# Patient Record
Sex: FEMALE | NOT HISPANIC OR LATINO | Employment: FULL TIME | URBAN - METROPOLITAN AREA
[De-identification: names, ages, dates, MRNs, and addresses within clinical notes are randomized per-mention and may not be internally consistent; named-entity substitution may affect disease eponyms.]

---

## 2024-10-03 PROBLEM — K43.2 INCISIONAL HERNIA: Status: ACTIVE | Noted: 2024-10-03

## 2024-10-03 PROBLEM — R42 LIGHTHEADEDNESS: Status: ACTIVE | Noted: 2024-10-03

## 2024-10-03 PROBLEM — R11.0 NAUSEA: Status: ACTIVE | Noted: 2024-10-03

## 2024-10-03 PROBLEM — R07.9 CHEST PAIN: Status: ACTIVE | Noted: 2024-10-03

## 2024-10-03 PROBLEM — R07.81 RIB PAIN: Status: ACTIVE | Noted: 2024-10-03
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
## 2024-10-03 PROBLEM — R07.2 PRECORDIAL PAIN: Status: ACTIVE | Noted: 2024-10-03

## 2024-10-03 PROBLEM — S29.9XXA CHEST INJURY: Status: ACTIVE | Noted: 2024-10-03

## 2024-10-03 PROBLEM — M17.9 OSTEOARTHRITIS OF KNEE: Status: ACTIVE | Noted: 2024-10-03

## 2024-10-03 PROBLEM — J45.901 EXACERBATION OF ASTHMA (HHS-HCC): Status: ACTIVE | Noted: 2024-10-03

## 2024-10-03 PROBLEM — I50.9 HEART FAILURE: Status: ACTIVE | Noted: 2024-10-03

## 2024-10-03 PROBLEM — I73.00 RAYNAUD'S PHENOMENON: Status: ACTIVE | Noted: 2024-10-03

## 2024-10-03 PROBLEM — R06.02 SHORTNESS OF BREATH: Status: ACTIVE | Noted: 2024-10-03

## 2024-10-03 PROBLEM — F41.9 ANXIETY DISORDER: Status: ACTIVE | Noted: 2024-10-03

## 2024-10-03 PROBLEM — E78.2 MIXED HYPERLIPIDEMIA: Status: ACTIVE | Noted: 2024-10-03

## 2024-10-03 PROBLEM — M79.606 PAIN OF LOWER EXTREMITY: Status: ACTIVE | Noted: 2024-10-03

## 2024-10-03 PROBLEM — R42 DIZZINESS: Status: ACTIVE | Noted: 2024-10-03

## 2024-10-03 PROBLEM — K57.92 DIVERTICULITIS: Status: ACTIVE | Noted: 2024-10-03

## 2024-10-03 PROBLEM — R07.89 CHEST DISCOMFORT: Status: ACTIVE | Noted: 2024-10-03

## 2024-10-03 PROBLEM — R05.9 COUGH: Status: ACTIVE | Noted: 2024-10-03

## 2024-10-03 PROBLEM — R61 EXCESSIVE SWEATING: Status: ACTIVE | Noted: 2024-10-03

## 2024-10-03 PROBLEM — R43.9 DISORDER OF TASTE: Status: ACTIVE | Noted: 2024-10-03

## 2024-10-03 PROBLEM — R10.9 ABDOMINAL PAIN: Status: ACTIVE | Noted: 2024-10-03

## 2024-10-14 ENCOUNTER — OFFICE VISIT (OUTPATIENT)
Dept: SURGERY | Facility: CLINIC | Age: 54
End: 2024-10-14
Payer: COMMERCIAL

## 2024-10-14 VITALS
TEMPERATURE: 98.3 F | BODY MASS INDEX: 31.92 KG/M2 | SYSTOLIC BLOOD PRESSURE: 102 MMHG | HEART RATE: 86 BPM | HEIGHT: 64 IN | WEIGHT: 187 LBS | DIASTOLIC BLOOD PRESSURE: 64 MMHG | OXYGEN SATURATION: 99 %

## 2024-10-14 DIAGNOSIS — Z87.19 HISTORY OF DIVERTICULITIS: ICD-10-CM

## 2024-10-14 DIAGNOSIS — K64.8 BLEEDING INTERNAL HEMORRHOIDS: Primary | ICD-10-CM

## 2024-10-14 PROCEDURE — 99203 OFFICE O/P NEW LOW 30 MIN: CPT | Performed by: STUDENT IN AN ORGANIZED HEALTH CARE EDUCATION/TRAINING PROGRAM

## 2024-10-14 PROCEDURE — 3008F BODY MASS INDEX DOCD: CPT | Performed by: STUDENT IN AN ORGANIZED HEALTH CARE EDUCATION/TRAINING PROGRAM

## 2024-10-14 PROCEDURE — 46221 LIGATION OF HEMORRHOID(S): CPT | Performed by: STUDENT IN AN ORGANIZED HEALTH CARE EDUCATION/TRAINING PROGRAM

## 2024-10-14 PROCEDURE — 99213 OFFICE O/P EST LOW 20 MIN: CPT | Performed by: STUDENT IN AN ORGANIZED HEALTH CARE EDUCATION/TRAINING PROGRAM

## 2024-10-14 RX ORDER — VIT C/E/ZN/COPPR/LUTEIN/ZEAXAN 250MG-90MG
1 CAPSULE ORAL
COMMUNITY

## 2024-10-14 RX ORDER — ROPINIROLE 0.25 MG/1
1 TABLET, FILM COATED ORAL 3 TIMES DAILY
COMMUNITY
Start: 2024-01-12

## 2024-10-14 RX ORDER — VARENICLINE TARTRATE 1 MG/1
1 TABLET, FILM COATED ORAL
COMMUNITY
Start: 2024-02-15

## 2024-10-14 RX ORDER — GABAPENTIN 400 MG/1
CAPSULE ORAL 3 TIMES DAILY
COMMUNITY
Start: 2024-09-16 | End: 2024-10-16

## 2024-10-14 RX ORDER — FLUTICASONE FUROATE AND VILANTEROL 200; 25 UG/1; UG/1
1 POWDER RESPIRATORY (INHALATION)
COMMUNITY
Start: 2024-08-15

## 2024-10-14 RX ORDER — ACETAMINOPHEN 325 MG/1
2 TABLET ORAL EVERY 6 HOURS
COMMUNITY
Start: 2024-04-19

## 2024-10-14 RX ORDER — ASPIRIN 81 MG/1
TABLET ORAL EVERY 24 HOURS
COMMUNITY

## 2024-10-14 RX ORDER — KETOCONAZOLE 20 MG/G
CREAM TOPICAL
COMMUNITY
Start: 2023-12-06

## 2024-10-14 RX ORDER — TIZANIDINE 4 MG/1
TABLET ORAL EVERY 8 HOURS PRN
COMMUNITY

## 2024-10-14 RX ORDER — FEXOFENADINE HCL AND PSEUDOEPHEDRINE HCL 180; 240 MG/1; MG/1
1 TABLET, EXTENDED RELEASE ORAL
COMMUNITY
Start: 2024-09-23 | End: 2025-03-22

## 2024-10-14 RX ORDER — LEVALBUTEROL TARTRATE 45 UG/1
2 AEROSOL, METERED ORAL EVERY 4 HOURS PRN
COMMUNITY
Start: 2024-09-16

## 2024-10-14 RX ORDER — BLOOD-GLUCOSE METER
EACH MISCELLANEOUS
COMMUNITY
Start: 2023-12-21

## 2024-10-14 RX ORDER — ATORVASTATIN CALCIUM 20 MG/1
1 TABLET, FILM COATED ORAL DAILY
COMMUNITY
Start: 2023-10-26

## 2024-10-14 RX ORDER — IBUPROFEN 100 MG/5ML
1000 SUSPENSION, ORAL (FINAL DOSE FORM) ORAL DAILY
COMMUNITY

## 2024-10-14 RX ORDER — OMEPRAZOLE 40 MG/1
1 CAPSULE, DELAYED RELEASE ORAL 2 TIMES DAILY
COMMUNITY
Start: 2024-09-23 | End: 2025-03-22

## 2024-10-14 RX ORDER — METFORMIN HYDROCHLORIDE 500 MG/1
2 TABLET, EXTENDED RELEASE ORAL
COMMUNITY
Start: 2023-10-26

## 2024-10-14 RX ORDER — TOPIRAMATE 25 MG/1
25 TABLET ORAL NIGHTLY
COMMUNITY

## 2024-10-14 RX ORDER — LANCETS 30 GAUGE
EACH MISCELLANEOUS
COMMUNITY
Start: 2023-12-19

## 2024-10-14 RX ORDER — ERGOCALCIFEROL 1.25 MG/1
CAPSULE ORAL WEEKLY
COMMUNITY
Start: 2024-09-30 | End: 2024-12-29

## 2024-10-14 RX ORDER — HYDROCODONE BITARTRATE AND ACETAMINOPHEN 5; 325 MG/1; MG/1
TABLET ORAL
COMMUNITY
Start: 2024-07-15

## 2024-10-14 RX ORDER — IPRATROPIUM BROMIDE AND ALBUTEROL SULFATE 2.5; .5 MG/3ML; MG/3ML
SOLUTION RESPIRATORY (INHALATION) 3 TIMES DAILY
COMMUNITY
Start: 2024-06-06

## 2024-10-14 RX ORDER — DULOXETIN HYDROCHLORIDE 30 MG/1
3 CAPSULE, DELAYED RELEASE ORAL DAILY
COMMUNITY
Start: 2024-07-15

## 2024-10-14 ASSESSMENT — PATIENT HEALTH QUESTIONNAIRE - PHQ9
10. IF YOU CHECKED OFF ANY PROBLEMS, HOW DIFFICULT HAVE THESE PROBLEMS MADE IT FOR YOU TO DO YOUR WORK, TAKE CARE OF THINGS AT HOME, OR GET ALONG WITH OTHER PEOPLE: NOT DIFFICULT AT ALL
SUM OF ALL RESPONSES TO PHQ9 QUESTIONS 1 AND 2: 1
2. FEELING DOWN, DEPRESSED OR HOPELESS: SEVERAL DAYS
1. LITTLE INTEREST OR PLEASURE IN DOING THINGS: NOT AT ALL

## 2024-10-14 ASSESSMENT — PAIN SCALES - GENERAL: PAINLEVEL: 0-NO PAIN

## 2024-10-14 NOTE — LETTER
October 14, 2024     Patient: Jessica Sadler   YOB: 1970   Date of Visit: 10/14/2024       To Whom It May Concern:    Jessica Sadler was seen in my clinic on 10/14/2024 at 3:00 pm. Please excuse Jessica for her absence from work on this day to make the appointment.    If you have any questions or concerns, please don't hesitate to call the office at [phone].         Sincerely,         Maria Luz Oden MD

## 2024-10-20 ASSESSMENT — ENCOUNTER SYMPTOMS
BLOOD IN STOOL: 0
SPEECH DIFFICULTY: 0
TROUBLE SWALLOWING: 0
VOICE CHANGE: 0
FACIAL ASYMMETRY: 0
RECTAL BLEEDING: 1
CHEST TIGHTNESS: 0
ARTHRALGIAS: 0
UNEXPECTED WEIGHT CHANGE: 0
WOUND: 0
ABDOMINAL PAIN: 0
NAUSEA: 0
DYSURIA: 0
ADENOPATHY: 0
FEVER: 0
HEMATURIA: 0
CHILLS: 0
VOMITING: 0
ANAL BLEEDING: 1
HEADACHES: 0
SORE THROAT: 0
BRUISES/BLEEDS EASILY: 0
DIARRHEA: 0
PALPITATIONS: 0
SHORTNESS OF BREATH: 0

## 2024-10-20 NOTE — PROGRESS NOTES
History Of Present Illness  Jessica Sadler is a 54 y.o. female presenting for evaluation of hemorrhoids seen on recent colonoscopy and following up after episode of diverticulitis in July. She reports occasional bleeding from her hemorrhoids, some discomfort. Colonoscopy was performed last month showed diverticula and hemorrhoids. She had uncomplicated diverticulitis in July and was treated with oral ABX.      Past Medical History  She has a past medical history of Anxiety disorder, COPD (chronic obstructive pulmonary disease) (Multi), Diverticulitis, High cholesterol, Lung cancer (Multi) (2024), and Sleep apnea (2024).    Surgical History  She has a past surgical history that includes Lung cancer surgery (2024); Breast biopsy;  section, classic; Tubal ligation; Hysterectomy; and Shoulder surgery (Left).     Social History  She reports that she has been smoking cigarettes. She has never been exposed to tobacco smoke. She has never used smokeless tobacco. She reports that she does not drink alcohol and does not use drugs.    Family History  Family History   Problem Relation Name Age of Onset    Diabetes type II Mother      Heart disease Mother      Cervical cancer Mother      Diabetes type II Father      Asthma Father          Allergies  Naproxen, Sulfa (sulfonamide antibiotics), Benzonatate, Cherry, Coconut, and Monosodium glutamate    Review of Systems   Constitutional:  Negative for chills, fever and unexpected weight change.   HENT:  Negative for sneezing, sore throat, trouble swallowing and voice change.    Respiratory:  Negative for chest tightness and shortness of breath.    Cardiovascular:  Negative for chest pain and palpitations.   Gastrointestinal:  Positive for anal bleeding. Negative for abdominal pain, blood in stool, diarrhea, nausea and vomiting.   Endocrine: Negative for cold intolerance and heat intolerance.   Genitourinary:  Negative for decreased urine volume, dysuria and  "hematuria.   Musculoskeletal:  Negative for arthralgias and gait problem.   Skin:  Negative for rash and wound.   Neurological:  Negative for facial asymmetry, speech difficulty and headaches.   Hematological:  Negative for adenopathy. Does not bruise/bleed easily.   Psychiatric/Behavioral:  Negative for self-injury and suicidal ideas.         Physical Exam  Vitals and nursing note reviewed.   Constitutional:       Appearance: Normal appearance.   HENT:      Head: Normocephalic and atraumatic.      Mouth/Throat:      Mouth: Mucous membranes are moist.      Pharynx: Oropharynx is clear.   Eyes:      Extraocular Movements: Extraocular movements intact.      Pupils: Pupils are equal, round, and reactive to light.   Cardiovascular:      Rate and Rhythm: Normal rate and regular rhythm.      Pulses: Normal pulses.   Pulmonary:      Effort: Pulmonary effort is normal.      Breath sounds: Normal breath sounds.   Abdominal:      General: There is no distension.      Palpations: Abdomen is soft.      Tenderness: There is no abdominal tenderness.   Genitourinary:      Musculoskeletal:      Cervical back: Normal range of motion and neck supple.   Skin:     General: Skin is warm and dry.   Neurological:      General: No focal deficit present.      Mental Status: She is alert and oriented to person, place, and time.   Psychiatric:         Mood and Affect: Mood normal.         Behavior: Behavior normal.          Last Recorded Vitals  Blood pressure 102/64, pulse 86, temperature 36.8 °C (98.3 °F), temperature source Oral, height 1.626 m (5' 4\"), weight 84.8 kg (187 lb), SpO2 99%.    Relevant Results  Reviewed reports from multiple previous CTs, images unavailable due to different hospital system     Assessment/Plan   Problem List Items Addressed This Visit    None  Visit Diagnoses         Codes    Bleeding internal hemorrhoids    -  Primary K64.8    History of diverticulitis     Z87.19          We discussed the etiology of " hemorrhoids and the different treatment options based on location. Because she is having bleeding, recommend banding here in the office. Discussed the procedure and postprocedural expectations. See procedure note. Will have her follow up in 6 weeks to reassess symptoms and discuss additional banding if needed    Also discussed diverticular disease and the etiology and different treatment options from conservative to surgical. Since her episodes have been fairly infrequent and managed with outpatient ABX, I recommended we continue with expectant management at this time. She has numerous diverticula throughout the colon so even though majority of her episodes have been in the descending/sigmoid area, resecting this would prevent future episodes in other diverticula. She understands and will continue to monitor for symptoms. Encouraged high fiber foods and fiber supplement such as benefiber.       Maria Luz Oden MD    Patient ID: Jessica Sadler is a 54 y.o. female.    Anoscopy    Date/Time: 10/14/2024 3:00 PM    Performed by: Maria Luz Oden MD  Authorized by: Maria Luz Oden MD    Consent:     Consent obtained:  Verbal and written    Consent given by:  Patient    Risks, benefits, and alternatives were discussed: yes      Risks discussed:  Bleeding and pain    Alternatives discussed:  No treatment and alternative treatment  Universal protocol:     Procedure explained and questions answered to patient or proxy's satisfaction: yes      Immediately prior to procedure, a time out was called: yes      Patient identity confirmed:  Verbally with patient  Indications:     Indications: rectal bleeding    Procedure details:     Internal hemorrhoids: yes      Internal hemorrhoid position: right and left posterior.  Post-procedure details:     Procedure completion:  Tolerated well, no immediate complications  Comments:      A lubed anoscope was placed into the anal canal and revealed enlarged right and left posterior internal  hemorrhoids. A suction  was placed at the base of each hemorrhoid column and a band was successfully deployed. Patient tolerated procedure well.

## 2024-12-06 PROBLEM — M99.04 SOMATIC DYSFUNCTION OF SACRAL REGION: Status: ACTIVE | Noted: 2020-11-18

## 2024-12-06 PROBLEM — J18.9 PNEUMONIA OF LEFT LOWER LOBE DUE TO INFECTIOUS ORGANISM: Status: ACTIVE | Noted: 2024-01-30

## 2024-12-06 PROBLEM — M25.512 ACUTE PAIN OF LEFT SHOULDER: Status: ACTIVE | Noted: 2018-06-25

## 2024-12-06 PROBLEM — M99.00 HEAD REGION SOMATIC DYSFUNCTION: Status: ACTIVE | Noted: 2021-01-14

## 2024-12-06 PROBLEM — K21.9 GERD (GASTROESOPHAGEAL REFLUX DISEASE): Status: ACTIVE | Noted: 2024-12-06

## 2024-12-06 PROBLEM — M47.813 SPONDYLOSIS OF CERVICOTHORACIC REGION WITHOUT MYELOPATHY OR RADICULOPATHY: Status: ACTIVE | Noted: 2022-01-26

## 2024-12-06 PROBLEM — M99.03 SOMATIC DYSFUNCTION OF LUMBAR REGION: Status: ACTIVE | Noted: 2020-12-07

## 2024-12-06 PROBLEM — E66.811 OBESITY, CLASS I, BMI 30-34.9: Status: ACTIVE | Noted: 2018-04-04

## 2024-12-06 PROBLEM — C34.90 LUNG CANCER (MULTI): Status: ACTIVE | Noted: 2024-04-15

## 2024-12-06 PROBLEM — R73.9 HYPERGLYCEMIA: Status: ACTIVE | Noted: 2024-02-05

## 2024-12-06 PROBLEM — G89.21 CHRONIC PAIN DUE TO TRAUMA: Status: ACTIVE | Noted: 2024-04-15

## 2024-12-06 PROBLEM — G25.81 RLS (RESTLESS LEGS SYNDROME): Status: ACTIVE | Noted: 2024-12-06

## 2024-12-06 PROBLEM — M25.561 PAIN IN RIGHT KNEE: Status: ACTIVE | Noted: 2022-05-24

## 2024-12-06 PROBLEM — R91.1 LUNG NODULE: Status: ACTIVE | Noted: 2024-01-30

## 2024-12-06 PROBLEM — K57.32 SIGMOID DIVERTICULITIS: Status: ACTIVE | Noted: 2024-01-30

## 2024-12-06 PROBLEM — M75.00 ADHESIVE CAPSULITIS: Status: ACTIVE | Noted: 2018-05-31

## 2024-12-06 PROBLEM — M99.02 THORACIC SEGMENT DYSFUNCTION: Status: ACTIVE | Noted: 2020-12-07

## 2024-12-06 PROBLEM — M99.05 SOMATIC DYSFUNCTION OF PELVIS REGION: Status: ACTIVE | Noted: 2020-11-18

## 2024-12-06 PROBLEM — C34.32 MALIGNANT NEOPLASM OF LOWER LOBE OF LEFT LUNG (MULTI): Status: ACTIVE | Noted: 2024-03-05

## 2024-12-06 PROBLEM — N39.3 SUI (STRESS URINARY INCONTINENCE, FEMALE): Status: ACTIVE | Noted: 2022-01-28

## 2024-12-06 PROBLEM — K64.0 FIRST DEGREE HEMORRHOIDS: Status: ACTIVE | Noted: 2020-09-30

## 2024-12-06 PROBLEM — J44.9 CHRONIC OBSTRUCTIVE PULMONARY DISEASE (MULTI): Status: ACTIVE | Noted: 2020-01-24

## 2024-12-06 PROBLEM — M99.01 CERVICAL SEGMENT DYSFUNCTION: Status: ACTIVE | Noted: 2020-12-07

## 2024-12-06 PROBLEM — J98.11 ATELECTASIS: Status: ACTIVE | Noted: 2024-04-15

## 2024-12-06 PROBLEM — M99.08 RIB CAGE REGION SOMATIC DYSFUNCTION: Status: ACTIVE | Noted: 2020-12-07

## 2024-12-06 PROBLEM — R39.15 URGENCY OF URINATION: Status: ACTIVE | Noted: 2022-01-28

## 2024-12-06 PROBLEM — G89.18 POST-OP PAIN: Status: ACTIVE | Noted: 2024-04-15

## 2024-12-06 PROBLEM — N95.1 PERIMENOPAUSE: Status: ACTIVE | Noted: 2019-10-04

## 2024-12-06 PROBLEM — M54.9 MID BACK PAIN: Status: ACTIVE | Noted: 2018-09-24

## 2024-12-06 PROBLEM — R15.9 FULL INCONTINENCE OF FECES: Status: ACTIVE | Noted: 2020-09-30

## 2024-12-06 PROBLEM — S43.439A LABRAL TEAR OF SHOULDER: Status: ACTIVE | Noted: 2018-05-31

## 2024-12-06 PROBLEM — M17.11 PRIMARY OSTEOARTHRITIS OF RIGHT KNEE: Status: ACTIVE | Noted: 2022-08-30

## 2024-12-06 PROBLEM — M54.2 NECK PAIN: Status: ACTIVE | Noted: 2018-09-24

## 2024-12-06 PROBLEM — Z98.890 POST-OPERATIVE NAUSEA AND VOMITING: Status: ACTIVE | Noted: 2024-04-11

## 2024-12-06 PROBLEM — R11.2 POST-OPERATIVE NAUSEA AND VOMITING: Status: ACTIVE | Noted: 2024-04-11

## 2024-12-06 PROBLEM — M54.12 CERVICAL RADICULOPATHY: Status: ACTIVE | Noted: 2022-08-22

## 2024-12-06 PROBLEM — J95.89 ACUTE POSTOPERATIVE RESPIRATORY INSUFFICIENCY: Status: ACTIVE | Noted: 2024-04-18

## 2024-12-06 PROBLEM — J41.1 MUCOPURULENT CHRONIC BRONCHITIS (MULTI): Status: ACTIVE | Noted: 2019-10-04

## 2024-12-17 ENCOUNTER — TELEPHONE (OUTPATIENT)
Dept: SURGERY | Facility: CLINIC | Age: 54
End: 2024-12-17

## 2024-12-17 ENCOUNTER — OFFICE VISIT (OUTPATIENT)
Dept: SURGERY | Facility: CLINIC | Age: 54
End: 2024-12-17
Payer: COMMERCIAL

## 2024-12-17 VITALS
DIASTOLIC BLOOD PRESSURE: 62 MMHG | OXYGEN SATURATION: 99 % | TEMPERATURE: 98 F | BODY MASS INDEX: 31.69 KG/M2 | HEART RATE: 96 BPM | WEIGHT: 185.6 LBS | HEIGHT: 64 IN | SYSTOLIC BLOOD PRESSURE: 100 MMHG

## 2024-12-17 DIAGNOSIS — K64.8 INTERNAL AND EXTERNAL BLEEDING HEMORRHOIDS: Primary | ICD-10-CM

## 2024-12-17 DIAGNOSIS — K64.4 INTERNAL AND EXTERNAL BLEEDING HEMORRHOIDS: Primary | ICD-10-CM

## 2024-12-17 PROCEDURE — 99214 OFFICE O/P EST MOD 30 MIN: CPT | Performed by: STUDENT IN AN ORGANIZED HEALTH CARE EDUCATION/TRAINING PROGRAM

## 2024-12-17 PROCEDURE — 3008F BODY MASS INDEX DOCD: CPT | Performed by: STUDENT IN AN ORGANIZED HEALTH CARE EDUCATION/TRAINING PROGRAM

## 2024-12-17 ASSESSMENT — ENCOUNTER SYMPTOMS
SHORTNESS OF BREATH: 0
ARTHRALGIAS: 0
FACIAL ASYMMETRY: 0
VOMITING: 0
CHILLS: 0
FEVER: 0
BLOOD IN STOOL: 0
VOICE CHANGE: 0
UNEXPECTED WEIGHT CHANGE: 0
HEADACHES: 0
ADENOPATHY: 0
ABDOMINAL PAIN: 0
PALPITATIONS: 0
HEMATURIA: 0
SORE THROAT: 0
DIARRHEA: 0
NAUSEA: 0
BRUISES/BLEEDS EASILY: 0
SPEECH DIFFICULTY: 0
TROUBLE SWALLOWING: 0
WOUND: 0
DYSURIA: 0
CHEST TIGHTNESS: 0

## 2024-12-17 ASSESSMENT — PATIENT HEALTH QUESTIONNAIRE - PHQ9
6. FEELING BAD ABOUT YOURSELF - OR THAT YOU ARE A FAILURE OR HAVE LET YOURSELF OR YOUR FAMILY DOWN: SEVERAL DAYS
SUM OF ALL RESPONSES TO PHQ QUESTIONS 1-9: 10
1. LITTLE INTEREST OR PLEASURE IN DOING THINGS: MORE THAN HALF THE DAYS
SUM OF ALL RESPONSES TO PHQ9 QUESTIONS 1 AND 2: 5
9. THOUGHTS THAT YOU WOULD BE BETTER OFF DEAD, OR OF HURTING YOURSELF: SEVERAL DAYS
10. IF YOU CHECKED OFF ANY PROBLEMS, HOW DIFFICULT HAVE THESE PROBLEMS MADE IT FOR YOU TO DO YOUR WORK, TAKE CARE OF THINGS AT HOME, OR GET ALONG WITH OTHER PEOPLE: NOT DIFFICULT AT ALL
4. FEELING TIRED OR HAVING LITTLE ENERGY: NEARLY EVERY DAY
3. TROUBLE FALLING OR STAYING ASLEEP OR SLEEPING TOO MUCH: NOT AT ALL
8. MOVING OR SPEAKING SO SLOWLY THAT OTHER PEOPLE COULD HAVE NOTICED. OR THE OPPOSITE, BEING SO FIGETY OR RESTLESS THAT YOU HAVE BEEN MOVING AROUND A LOT MORE THAN USUAL: NOT AT ALL
2. FEELING DOWN, DEPRESSED OR HOPELESS: NEARLY EVERY DAY
7. TROUBLE CONCENTRATING ON THINGS, SUCH AS READING THE NEWSPAPER OR WATCHING TELEVISION: NOT AT ALL
5. POOR APPETITE OR OVEREATING: NOT AT ALL

## 2024-12-17 ASSESSMENT — PAIN SCALES - GENERAL: PAINLEVEL_OUTOF10: 0-NO PAIN

## 2024-12-17 NOTE — Clinical Note
Please schedule this patient for an excisional hemorrhoidectomy at [facility] with Dr. Oden on Wednesday 1/08/2025

## 2024-12-17 NOTE — LETTER
December 17, 2024     Patient: Jessica Sadler   YOB: 1970   Date of Visit: 12/17/2024       To Whom It May Concern:    Jessica Sadler was seen in my clinic on 12/17/2024 at 9:15 am. Please excuse Jessica for her absence from work on this day to make the appointment.    If you have any questions or concerns, please don't hesitate to call.         Sincerely,         Maria Luz Oden MD

## 2024-12-17 NOTE — PROGRESS NOTES
History Of Present Illness  Jessica Sadler is a 54 y.o. female following up after hemorrhoid banding. She reports the bleeding has improved but still has a small amount with wiping. Her bigger concern is the external hemorrhoid that causes hygiene issues after a BM. Also with some swelling and discomfort    Past Medical History  She has a past medical history of Anxiety disorder, COPD (chronic obstructive pulmonary disease) (Multi), Diverticulitis, High cholesterol, Lung cancer (Multi) (2024), and Sleep apnea (2024).    Surgical History  She has a past surgical history that includes Lung cancer surgery (2024); Breast biopsy;  section, classic; Tubal ligation; Hysterectomy; and Shoulder surgery (Left).     Social History  She reports that she has been smoking cigarettes. She has never been exposed to tobacco smoke. She has never used smokeless tobacco. She reports that she does not drink alcohol and does not use drugs.    Family History  Family History   Problem Relation Name Age of Onset    Diabetes type II Mother      Heart disease Mother      Cervical cancer Mother      Diabetes type II Father      Asthma Father          Allergies  Naproxen, Sulfa (sulfonamide antibiotics), Benzonatate, Cherry, Coconut, and Monosodium glutamate    Review of Systems   Constitutional:  Negative for chills, fever and unexpected weight change.   HENT:  Negative for sneezing, sore throat, trouble swallowing and voice change.    Respiratory:  Negative for chest tightness and shortness of breath.    Cardiovascular:  Negative for chest pain and palpitations.   Gastrointestinal:  Negative for abdominal pain, blood in stool, diarrhea, nausea and vomiting.   Endocrine: Negative for cold intolerance and heat intolerance.   Genitourinary:  Negative for decreased urine volume, dysuria and hematuria.   Musculoskeletal:  Negative for arthralgias and gait problem.   Skin:  Negative for rash and wound.   Neurological:   "Negative for facial asymmetry, speech difficulty and headaches.   Hematological:  Negative for adenopathy. Does not bruise/bleed easily.   Psychiatric/Behavioral:  Negative for self-injury and suicidal ideas.         Physical Exam  Vitals and nursing note reviewed.   Constitutional:       Appearance: Normal appearance.   HENT:      Head: Normocephalic and atraumatic.      Mouth/Throat:      Mouth: Mucous membranes are moist.      Pharynx: Oropharynx is clear.   Eyes:      Extraocular Movements: Extraocular movements intact.      Pupils: Pupils are equal, round, and reactive to light.   Cardiovascular:      Rate and Rhythm: Normal rate and regular rhythm.      Pulses: Normal pulses.   Pulmonary:      Effort: Pulmonary effort is normal.      Breath sounds: Normal breath sounds.   Abdominal:      General: There is no distension.      Palpations: Abdomen is soft.      Tenderness: There is no abdominal tenderness.   Genitourinary:     Comments: Right anterior external hemorrhoid  Musculoskeletal:      Cervical back: Normal range of motion and neck supple.   Skin:     General: Skin is warm and dry.   Neurological:      General: No focal deficit present.      Mental Status: She is alert and oriented to person, place, and time.   Psychiatric:         Mood and Affect: Mood normal.         Behavior: Behavior normal.          Last Recorded Vitals  Blood pressure 100/62, pulse 96, temperature 36.7 °C (98 °F), temperature source Oral, height 1.626 m (5' 4\"), weight 84.2 kg (185 lb 9.6 oz), SpO2 99%.    Assessment/Plan   Problem List Items Addressed This Visit    None  Visit Diagnoses         Codes    Internal and external bleeding hemorrhoids    -  Primary K64.4, K64.8          Internal bleeding hemorrhoids, improved after hemorrhoid banding but still some occasional blood with wiping. Bigger concern is the external hemorrhoid that causes some discomfort and hygiene issues after a BM. We discussed rebanding the internal " hemorrhoids for the bleeding symptoms but she wants the external skin tags addressed. Discussed this would require an excisional hemorrhoidectomy.  I discussed the procedure in detail including postoperative expectations.  We discussed pain control to include Tylenol, muscle relaxer and narcotic which should be taken in that order.  Also discussed adding MiraLAX to avoid constipation.  Discussed that the most discomfort is generally within the first 1-2week, however can take up to 8 weeks for full healing. She understands and agrees to proceed. Will schedule at [facility].      Maria Luz Oden MD

## 2024-12-17 NOTE — TELEPHONE ENCOUNTER
----- Message from Nurse Taty MCCOY sent at 12/17/2024  9:50 AM EST -----  Please schedule this patient for an excisional hemorrhoidectomy at [facility] with Dr. Oden on Wednesday 1/08/2025

## 2024-12-17 NOTE — TELEPHONE ENCOUNTER
Patient is scheduled for surgery with Dr. Oden at [facility] on Wednesday 01/08/2025.  [facility] will contact patient with pre-admission testing date/time.

## 2024-12-17 NOTE — PATIENT INSTRUCTIONS
Thank you for scheduling surgery with Dr. Oden   Below you will find your Surgery Itinerary to include dates, times, and locations for appointments involved with your procedure.      A representative from the hospital will contact you directly to schedule any Pre Admission Testing appointments needed.    On the day before the scheduled surgery, please call the Same Day Surgery department between 2-4 pm for a time of arrival for the day of procedure.  [facility] [phone]    Nothing to eat or drink after midnight the night before surgery    Surgery with Dr. Oden at [facility] - [address], [place] [address]  On: Wednesday 1/08/2025    Postoperative appointment is scheduled at Surgery office locatoins: [facility] General Surgery [address], [place], [place] [id number] [phone]           On: Thursday 2/06/2025 at 9:15am    *Please note, you may receive a call from our financial counselors if you have a financial liability greater than $250.         [facility]  Pre - Operative Instructions     Your time of arrival for surgery is available the day before surgery. *If the surgery is on a Monday, or the Tuesday following a Monday Holiday, please call Same Day Surgery the Friday before your surgery date.*    DO NOT EAT OR DRINK ANYTHING AFTER MIDNIGHT THE NIGHT BEFORE SURGERY. This includes any beverages (coffee, water, soda, etc.), hard candy, gum or mints. If this is not followed, surgery may be canceled. Please avoid eating a large meal the evening before surgery. Please do not DRINK ALCOHOL or SMOKE FOR 24 HOURS before surgery.     Insulin Instructions - Please do not take any short acting Insulin (Regular or NPH). Do not take any oral diabetic medication on the day of surgery. Long acting Insulins may be taken (Lantus). We will check your blood sugar and administer at the hospital the day of surgery. Patient who have Insulin pumps are to make NO  adjustments.     Prescription Medications - You are encouraged to take prescription medications including heart, blood pressure, anti-seizure, anxiety, breathing medications (including inhalers) with exception to diabetic medications prior to arriving at the hospital the day of surgery. You may take prescribed pain medications as needed. Please remember the dose and time taken so we may inform your Anesthesiologist.     Please bring the name, dosage, and frequency of your medications if you did not provide these on the day of Pre Admission Testing. You may bring the actual bottles if this would be easier for you.     Please bring your prescribed inhalers with you the morning of surgery.     Patients on Anti-platelet and Anticoagulant agents, please read the following:  ASA, NSAIDS stop 5 days prior to surgery  Coumadin stop 5 days prior to surgery unless bridging therapy is needed (metal valve replacement, cardiac stent placement) - if so please speak to your Cardiologist or prescribing physician.   Other anti-platelet and anticoagulant agents:  Plavix (Clopidogrel) stop 5 days prior to surgery  Brilinta (Ticagrelor) stop 5 days prior to surgery   Effient (Prasugrel) stop 7 days prior to surgery   Lovenox (Enoxaparin) stop 24 hours prior to surgery  Arixtra (Fondaparinux) stop 5 days prior to surgery  Xarelto (Rivaroxaban) stop 3 days prior to surgery  Pradaxa (Dabigatran) stop 5 days prior to surgery  Eliquis (Apixaban) stop 3 days prior to surgery   Savaysa (Endoxaban) stop days prior to surgery     Please stop all herbal medications 2 weeks prior to surgery     C-PAP Devices - If you have a C-PAP device at home, bring it with you on our day of surgery if your surgery requires you to stay overnight.     Please bring a copy of any Advanced Directives the day of surgery if you did not provide it at Pre Admission Testing. These documents are living camacho and durable power of  for healthcare.     Please notify  your physician/surgeon if you develop a cold, sore throat, fever, flu symptoms, COVID symptoms or any changes in your physical conditions.     A shower or bath is preferred the evening before or the morning of surgery.     Remove jewelry before admission to the hospital. It is no longer permitted to tape rings. We ask that you leave all valuables at home. Any items of value will be given to a family member or locked up by security.   If you have a body piercing g that you cannot remove, it is recommended that you have it removed professionally and have a plastic spacer inserted. There is a risk for surgical burns with jewelry left in place.     Remove or wear minimal makeup the day of surgery. You will be asked to remove glasses and contact lenses prior to surgery. Please bring a glass case and/or contact lens case with you. These items are not provided.     Dentures and partials are usually removed prior to surgery. A denture cup will be provided for you.       You may be asked to remove nail polish. Acrylic nails are now acceptable.     Wear loose comfortable clothing that you will be able to fit over bandages when you leave the hospitals (as appropriate for your surgery).    Patients that are under the age of 18 years must have a parent or guardian present the day of surgery.     Family members of significant others may stay with you on the Same Day Surgery unit. While you are in surgery, they may wait for you in the family waiting area. The physician will speak to the waiting family, if permitted by the patient, after surgery.     Changes or delays in the surgery schedule may occur due to emergencies. The hospital will notify you if this occurs. We apologize for any inconveniences this may present.     You must have a responsible  available to drive you home after surgery. You will not be permitted to drive yourself home after surgery if you have received any anesthesia or sedation during your procedure.      Please visit our website at [email or web address] for more information regarding [facility] services.      For questions about your Pre Admission Testing (PAT)  [facility] [phone]  [facility] [phone]    Thank you for choosing [facility]!

## 2024-12-19 ENCOUNTER — TELEPHONE (OUTPATIENT)
Dept: SURGERY | Facility: CLINIC | Age: 54
End: 2024-12-19
Payer: COMMERCIAL

## 2024-12-19 NOTE — LETTER
December 19, 2024     Patient: Jessica Sadler   YOB: 1970       Thank you for scheduling surgery with Dr. Oden. Below you will find your Surgery Itinerary to include dates, times, and locations for appointments involved with your procedure.    Pre Admission Testing at [facility] [place] - [address], [place] [address]    On the day before the scheduled surgery, please call the Same Day Surgery department between 2-4 pm for a time of arrival for the day of procedure.  [facility] [phone]    Nothing to eat or drink after midnight the night before surgery    Surgery with Dr. Oden at [facility] [place] - [address], [place] [address] on Wednesday 03/12/2025    Postoperative appointment is scheduled at [facility] General Surgery office: [facility], [place] [address]  On Thursday 03/27/2025 @ 08:45 am    *Please note, you may receive a call from our financial counselors if you have a financial liability greater than $250.     Thank you for choosing [facility]!                           [facility]  PATIENT HEALTH HISTORY  **please complete and bring with you**      NAME: _____________________________________________DATE______________    Reason for Admission_________________________________________________________________    Phone number ______________Cell: ________________Family Physician_______________________                                                                                              Other Physicians___________________________           LIST ALLERGIES /REACTIONS                                                        NO       YES      Reaction  Allergy to Latex (Rubber)      Allergic to Tape, band aids, adhesive      Have you had a reaction to Anesthesia      Has a blood relative had a reaction to Anesthesia      History of Malignant Hyperthermia with you/relative      History of nausea/vomiting with  Anesthesia        Please list prescription and over the counter medications presently taken.  Include any eye drops, vitamins,   oxygen, inhalers, herbs, non-prescription pain medications, etc.    MEDICATION NAME              DOSE AND FREQUENCY               REASON TAKEN                                                              Resume All Medications After Surgery    Yes       No      LIST ALL PRIOR SURGERIES, HOSPITALIZATIONS, AND DATES:  _________________________________________________________________________________________  _________________________________________________________________________________________  _________________________________________________________________________________________  _________________________________________________________________________________________  _________________________________________________________________________________________  _________________________________________________________________________________________  ___________________________________________________________________________________________      yes  no   yes no   Cold/sore throat past 2 weeks?     Chipped/loose/missing teeth?       Chronic or frequent coughs?     Physical prosthesis?       Shortness of breath?     Plates/Pins/Screws? If so, where?       Sleep apnea? If so, C-PAP? O2 use?     Body piercings? If so, where?       Emphysema, COPD, asthma?     Low Blood Pressure?       Other lung problems? TB, pneumonia?     High Blood pressure?       Do you smoke? Vape? Tobacco?     Rheumatic fever? Heart murmur?            If so, how much?     Chest pain? Angina? Heart Attack?             How long?     Congestive heart failure? Mitral Valve Prolapse?            Quit? If so, when?     Atrial Fibrillation? Fast or Irregular Heartbeat?       Have you ever been on steroids?     Pacemaker? AICD? Cardiac stents?            If so, when?     Back, neck pain or other injuries?             Cortisone, prednisone?     Nerve Stimulator? Pain management?       Drug use? (opioids, marijuana, etc.?)     MS? Parkinson's? Muscular Problems?            If so, what?     Epilepsy? Seizures?       Alcohol?          If so, when was your last seizure?            How much? How often?     Stroke? Paralysis? TIA?       Cancer?     Have you ever had a blood transfusion?            Type:     You or relative have history of bleeding or             Radiation? Chemo? Med-port? ([place]     clotting problems? DVT's? Blood clots?       Psychiatric Problems?     OTHER HEALTH ISSUES       (anxiety, depression, bipolar, etc.)     FEMALES ONLY       Dizziness, vertigo, motion sickness?     Possibility of being pregnant?       Kidney or bladder problems? Stent?     Last menstrual period?        Diabetes? (high blood sugar)          Date:       Thyroid Problems?     Menopause?        Hiatal Hernia, Ulcer, GERD, Reflux?     Hysterectomy?       Hepatitis, cirrhosis, liver disease, jaundice?     PEDIATRIC PATIENTS        TMJ? Difficulty opening mouth widely?     Parent/Sibling allergies? Medical History?        Anemia, sickle cell?     Developmental delays? Autism? ADD? ADHD?        Glasses? Contacts? Hearing Aids? R:        L:     Learning Disability? Premature Birth?       Dentures? Bridges? Caps? Crowns? Braces?      Comments:         Review date _________________         No Changes      Updated    Initials ______       Review date _________________         No Changes      Updated    Initials ______       Review date _________________         No Changes      Updated    Initials ______       Review date _________________         No Changes      Updated    Initials ______               Revised 4/2023 form # 1025

## 2024-12-19 NOTE — TELEPHONE ENCOUNTER
Patient is currently scheduled for surgery with Dr. Oden at [facility] on Wednesday 01/08/2025.  Patient calls office stating she would like to reschedule surgery for later date to take place after scheduled CT scan (scheduled 02/21/2025),   Patient requests new surgery date of 03/12/2025.    Patient is now scheduled for surgery at [facility] at 03/12/2025.  Postoperative visit is rescheduled and new itinerary has been sent to patient via [employer].

## 2025-02-06 ENCOUNTER — APPOINTMENT (OUTPATIENT)
Dept: SURGERY | Facility: CLINIC | Age: 55
End: 2025-02-06
Payer: COMMERCIAL

## 2025-03-10 ENCOUNTER — TELEPHONE (OUTPATIENT)
Dept: SURGERY | Facility: CLINIC | Age: 55
End: 2025-03-10
Payer: COMMERCIAL

## 2025-03-10 NOTE — TELEPHONE ENCOUNTER
Spoke to pt verified by name/.  Pt states that she would like to cancel scheduled surgery on 3/12/25  D/t she can't afford the cost associated with the surgery.   Pt states that if she decides to reschedule the surgery she will  Call the office back. Pt verbalized understanding and denies further  Questions.

## 2025-03-10 NOTE — Clinical Note
March 10, 2025     Patient: Jessica Sadler   YOB: 1970   Date of Visit: 3/10/2025       To Whom It May Concern:    Jessica Sadler was seen in my clinic on 3/10/2025 at . Please excuse Jessica for her absence from work on this day to make the appointment.    If you have any questions or concerns, please don't hesitate to call.         Sincerely,         Maria Luz Oden MD        CC: No Recipients

## 2025-03-10 NOTE — TELEPHONE ENCOUNTER
"Patient is currently scheduled for surgery with Dr. Oden at [facility] surgery [place] on Wednesday 03/12/2025.    Received message from [facility] that she spoke with patient, \"I have a patient that will be reaching out to your office to either reschedule at a different facility or cancel all together. Jessica Sadler (1970) Please let our office know when you guys speak with her\"    Please contact patient and identify if she would like to reschedule surgery date at [facility] (next avail 04/09/2025) as suggested by message above.  "

## 2025-03-10 NOTE — TELEPHONE ENCOUNTER
[facility] notified of patient cancellation request. Surgery has been cancelled with Dr. Oden 03/12/2025.

## 2025-03-27 ENCOUNTER — APPOINTMENT (OUTPATIENT)
Dept: SURGERY | Facility: CLINIC | Age: 55
End: 2025-03-27
Payer: COMMERCIAL